# Patient Record
Sex: MALE | Race: WHITE | NOT HISPANIC OR LATINO | URBAN - METROPOLITAN AREA
[De-identification: names, ages, dates, MRNs, and addresses within clinical notes are randomized per-mention and may not be internally consistent; named-entity substitution may affect disease eponyms.]

---

## 2019-11-16 ENCOUNTER — SURGERY - HEALTHEAST (OUTPATIENT)
Dept: GASTROENTEROLOGY | Facility: HOSPITAL | Age: 34
End: 2019-11-16

## 2019-11-16 ENCOUNTER — SURGERY - HEALTHEAST (OUTPATIENT)
Dept: SURGERY | Facility: HOSPITAL | Age: 34
End: 2019-11-16

## 2019-11-16 ENCOUNTER — ANESTHESIA - HEALTHEAST (OUTPATIENT)
Dept: SURGERY | Facility: HOSPITAL | Age: 34
End: 2019-11-16

## 2019-11-16 ENCOUNTER — HOSPITAL ENCOUNTER (OUTPATIENT)
Dept: SURGERY | Facility: HOSPITAL | Age: 34
Discharge: HOME | End: 2019-11-16
Attending: INTERNAL MEDICINE | Admitting: INTERNAL MEDICINE

## 2019-11-16 DIAGNOSIS — T18.108A FOREIGN BODY IN ESOPHAGUS, INITIAL ENCOUNTER: ICD-10-CM

## 2019-11-16 ASSESSMENT — MIFFLIN-ST. JEOR
SCORE: 1832.57

## 2021-06-03 NOTE — ANESTHESIA CARE TRANSFER NOTE
Last vitals:   Vitals:    11/16/19 1943   BP: 150/74   Pulse: 88   Resp: 16   Temp: 37.6  C (99.6  F)   SpO2: 100%     Patient's level of consciousness is drowsy  Spontaneous respirations: yes  Maintains airway independently: yes  Dentition unchanged: yes  Oropharynx: oropharynx clear of all foreign objects    QCDR Measures:  ASA# 20 - Surgical Safety Checklist: WHO surgical safety checklist completed prior to induction    PQRS# 430 - Adult PONV Prevention: 4558F - Pt received => 2 anti-emetic agents (different classes) preop & intraop  ASA# 8 - Peds PONV Prevention: NA - Not pediatric patient, not GA or 2 or more risk factors NOT present  PQRS# 424 - Tabatha-op Temp Management: 4559F - At least one body temp DOCUMENTED => 35.5C or 95.9F within required timeframe  PQRS# 426 - PACU Transfer Protocol: - Transfer of care checklist used  ASA# 14 - Acute Post-op Pain: ASA14B - Patient did NOT experience pain >= 7 out of 10     Volatile agents turned off, muscle relaxant reversed, 4/4 twitches with sustained tetany. Pt breathing spontaneously with adequate tidal volumes, following commands, gently suctioned oropharynx, extubated without issue. 10LPM O2 applied via face mask.Transported by CRNA and RN to recovery.

## 2021-06-03 NOTE — ANESTHESIA PREPROCEDURE EVALUATION
Anesthesia Evaluation      No history of anesthetic complications     Airway   Mallampati: I  Neck ROM: full   Pulmonary - negative ROS    breath sounds clear to auscultation                         Cardiovascular   Exercise tolerance: > or = 4 METS  Rhythm: regular  Rate: normal,         Neuro/Psych - negative ROS     Endo/Other    (+) obesity,      GI/Hepatic/Renal      Comments: Piece of pork stuck in esophagus, not cleared with EZ gas or glucagon in ED, s/f EGD for removal.      Other findings: Pt ate (and caused FB) @ 1400 (5 hrs ago) Actively retching, liquid/bile/secretions frequently spit into emesis basin      Dental - normal exam                        Anesthesia Plan  Planned anesthetic: general endotracheal  GETA-RSI with cricoid pressure and succinylcholine  Decadron 10, Zofran 4 for antiemesis  ASA 2 - emergent   Induction: intravenous   Anesthetic plan and risks discussed with: patient and spouse  Anesthesia plan special considerations: rapid sequence induction, antiemetics,   Post-op plan: routine recovery

## 2021-06-03 NOTE — ED PROVIDER NOTES
EMERGENCY DEPARTMENT ENCOUNTER      NAME: Nicola Freedman  AGE: 34 y.o. male  YOB: 1985  MRN: [id number]  EVALUATION DATE & TIME: 11/16/2019  2:57 PM    ED PROVIDER: Brennen Alfonso M.D.    FINAL IMPRESSION:  1. Foreign body in esophagus, initial encounter      ED COURSE & MEDICAL DECISION MAKING:    3:07 PM  Patient seen and examined.  Prior history and records reviewed.  Patient presents with inability to swallow secretions after getting pork stuck in his throat.  Breathing and speaking without difficulty.  Glucagon and easy gas are ordered.  If this does not relieve symptoms, gastroenterology will be consulted.  Long-term, patient should start a PPI.  3:58 PM No improvement after glucagon and EZ Gas.  GI will be consulted.   4:28 PM care discussed with KOLBY Ward who will see the patient in the department.  5:00 PM  Signout to oncoming provider.    At the conclusion of the encounter I discussed the results of all of the tests and the disposition. The questions were answered. The patient or family acknowledged understanding and was agreeable with the care plan.     PROCEDURES:   Procedures      MEDICATIONS GIVEN IN THE EMERGENCY:  Medications   glucagon (human recombinant) injection 1 mg (1 mg Intravenous Given 11/16/19 1544)   sod bicarb-citric acid-simethicone packet 1 packet (E-Z GAS) (1 packet Oral Given 11/16/19 4923)       NEW PRESCRIPTIONS STARTED AT TODAY'S ER VISIT  There are no discharge medications for this patient.       PCP: No primary care provider on file.  =================================================================    Chief Complaint   Patient presents with     esophageal obstruction       HPI      Nicola Freedman is a 34 y.o. male who presents with esophageal foreign body.  Patient was eating pork, feels like he has a piece stuck.  He has had this before but usually gets better on its own.  At this occurred about 1 hour ago.  He is unable to swallow and is spitting  secretions.    REVIEW OF SYSTEMS   Review of Systems   See HPI.  All other systems reviewed and negative.    PAST MEDICAL HISTORY:  Relevant past medical history reviewed with patient, unless otherwise stated in HPI, history not pertinent to this visit.    PAST SURGICAL HISTORY:  Relevant past surgical history reviewed with patient, unless otherwise stated in HPI, history not pertinent to this visit.    CURRENT MEDICATIONS:    No current facility-administered medications on file prior to encounter.      No current outpatient medications on file prior to encounter.       ALLERGIES:  No Known Allergies    FAMILY HISTORY:  No family history on file.    SOCIAL HISTORY:   Social History     Socioeconomic History     Marital status: Not on file     Spouse name: Not on file     Number of children: Not on file     Years of education: Not on file     Highest education level: Not on file   Occupational History     Not on file   Social Needs     Financial resource strain: Not on file     Food insecurity:     Worry: Not on file     Inability: Not on file     Transportation needs:     Medical: Not on file     Non-medical: Not on file   Tobacco Use     Smoking status: Not on file   Substance and Sexual Activity     Alcohol use: Not on file     Drug use: Not on file     Sexual activity: Not on file   Lifestyle     Physical activity:     Days per week: Not on file     Minutes per session: Not on file     Stress: Not on file   Relationships     Social connections:     Talks on phone: Not on file     Gets together: Not on file     Attends [employer] witness service: Not on file     Active member of club or organization: Not on file     Attends meetings of clubs or organizations: Not on file     Relationship status: Not on file     Intimate partner violence:     Fear of current or ex partner: Not on file     Emotionally abused: Not on file     Physically abused: Not on file     Forced sexual activity: Not on file   Other Topics Concern     Not  on file   Social History Narrative     Not on file       VITALS:  Patient Vitals for the past 24 hrs:   BP Temp Temp src Pulse Resp SpO2 Weight   11/16/19 1455 140/78 98.2  F (36.8  C) Oral 67 18 100 % 200 lb (90.7 kg)     PHYSICAL EXAM    Physical Exam   Constitutional: He is oriented to person, place, and time. He appears well-developed and well-nourished.   HENT:   Head: Normocephalic and atraumatic.   Nose: Nose normal.   Mouth/Throat: Oropharynx is clear and moist.   Eyes: Pupils are equal, round, and reactive to light. Conjunctivae and EOM are normal.   Neck: Normal range of motion. Neck supple.   Cardiovascular: Normal rate, regular rhythm and normal heart sounds.   Pulmonary/Chest: Effort normal and breath sounds normal.   Abdominal: Soft. Bowel sounds are normal. There is no abdominal tenderness. There is no rebound and no guarding.   Spitting secretions   Musculoskeletal: Normal range of motion.   Lymphadenopathy:     He has no cervical adenopathy.   Neurological: He is alert and oriented to person, place, and time. Coordination normal.   No gross focal neurologic deficits.  Cranial nerves III-XII intact.   Skin: Skin is warm and dry.   Psychiatric: He has a normal mood and affect. His behavior is normal.   Nursing note and vitals reviewed.    Brennen Alfonso M.D.  Emergency Medicine  [facility] EMERGENCY DEPARTMENT  [address] [facility] [address]  Dept: [phone]  Loc: [phone]     Brennen Alfonso MD  11/16/19 2683

## 2021-06-03 NOTE — ED TRIAGE NOTES
Pt reports eating pork that became stuck in his esophagus x1 hour ago. Reports that this has happened several times in the past and is usually able to clear the obstruction but has not been successful today.

## 2021-06-03 NOTE — ANESTHESIA POSTPROCEDURE EVALUATION
Patient: Nicola Freedman  ESOPHAGOGASTRODUODENOSCOPY WITH FOREIGN BODY REMOVAL  Anesthesia type: general    Patient location: PACU  Last vitals:   Vitals Value Taken Time   /71 11/16/2019  8:00 PM   Temp 37.6  C (99.6  F) 11/16/2019  7:43 PM   Pulse 84 11/16/2019  8:00 PM   Resp 16 11/16/2019  8:00 PM   SpO2 94 % 11/16/2019  8:00 PM     Post vital signs: stable  Level of consciousness: awake and responds to simple questions  Post-anesthesia pain: pain controlled  Post-anesthesia nausea and vomiting: no  Pulmonary: unassisted, return to baseline, room air  Cardiovascular: stable and blood pressure at baseline  Hydration: adequate  Anesthetic events: no    QCDR Measures:  ASA# 11 - Tabatha-op Cardiac Arrest: ASA11B - Patient did NOT experience unanticipated cardiac arrest  ASA# 12 - Tabatha-op Mortality Rate: ASA12B - Patient did NOT die  ASA# 13 - PACU Re-Intubation Rate: ASA13B - Patient did NOT require a new airway mgmt  ASA# 10 - Composite Anes Safety: ASA10A - No serious adverse event    Additional Notes:

## 2021-06-03 NOTE — PROCEDURES
ESOPHAGOGASTRODUODENOSCOPY NOTE        Patient Name:            Nicola Freedman      Date of Procedure:   November 16, 2019      Endoscopist:             Bautista West      Procedure:                                ESOPHAGOGASTRODUODENOSCOPY      Pre-operative Diagnosis:         Esophageal foreign body      Post-operative Diagnosis:       same      Indications:                               food impaction      Medications:                             Versed 6.0 mg, Fentanyl 200 mcg, Benadryl 50 mg    The procedure was performed with administration of intravenous conscious sedation with appropriate preoperative, intraoperative, and postoperative evaluations.  0 Hr 23 Min 35 Sec of supervised face to face conscious sedation time was provided by a Registered Nurse under my direct supervision.    Procedure Details    The patient was informed of the risks, benefits and alternatives and gave informed consent. The patient had stable cardiovascular status and was judged to be adequate for sedation. A timeout was performed.    The patient was placed in the left lateral decubitus position. The endoscope was introduced through the mouth and advanced to upper esophagus.     Endoscopy Findings:                Esophagus: Food bolus at 25 cm, small pieces of meat were retrieved using a net, snare, and raptor forceps. Procedure was terminated due to patient intolerance and inadequate sedation.  Stomach: n/a  Duodenum: n/a    Estimated Blood Loss:           none      Specimens:                              none      Complications:                       None; patient tolerated the procedure well.      Impression:                             Esophageal foreign body               Recommendations:                Repeat procedure with anesthesia support in OR      Bautista West MD  11/16/2019 6:18 PM  [facility]  [phone]

## 2021-06-03 NOTE — PROGRESS NOTES
PRE PROCEDURE NOTE      Chief Complaint/Reason for Procedure:              Esophageal FB      History and Physical Reviewed:   Reviewed no changes               Pre-sedation assessment:            General appearance: alert, appears stated age and cooperative  Lungs: clear to auscultation bilaterally  Heart: regular rate and rhythm, S1, S2 normal, no murmur, click, rub or gallop  Abdomen: soft, non-tender; bowel sounds normal; no masses,  no organomegaly  Airway: normal      Previous reaction to anesthesia/sedation:  none      Sedation Plan based on assessment: Moderate      Comments: none      ASA Classification: ASA status not filed in the log.      Impression:  Patient deemed adequate candidate for conscious sedation         Plan:   esophagogastroduodenoscopy      Bautista West MD  11/16/2019 5:31 PM  [facility]  [phone]

## 2021-06-03 NOTE — ED PROVIDER NOTES
eMERGENCY dEPARTMENT PROGRESS NOTE      Patient was signed out to me by Dr. Alfonso at 4:55 PM.      ED COURSE & MEDICAL DECISION MAKING    Pertinent Labs and Imagaing reviewed (see chart for details)    34 y.o. male who presented for evaluation of esophageal foreign body. The patient was eating pork, feel like he has a piece stuck. He has had this before but usually relieves on its own. Occurred about 1 hour ago. He is unable to swallow and is spitting secretions.    5:44 PM GI arrived and began removal process.  6:10 PM GI was unsuccessful and anesthesia and surgery has been paged.  6:54 PM The patient is being taken to the OR for removal and will be discharged or admitted from there.    At the conclusion of the encounter I discussed  the results of all of the tests and the disposition.   The questions were answered.  The patient or family acknowledged understanding and was agreeable with the care plan.       FINAL IMPRESSION        Final diagnoses:   Foreign body in esophagus, initial encounter            Janelle Berry MD  11/16/19 1228

## 2021-06-03 NOTE — OP NOTE
ESOPHAGOGASTRODUODENOSCOPY NOTE        Patient Name:            Nicola Freedman      Date of Procedure:   November 16, 2019      Endoscopist:             Bautista West      Procedure:                                ESOPHAGOGASTRODUODENOSCOPY      Pre-operative Diagnosis:         Esophageal foreign body      Post-operative Diagnosis:       same      Indications:                               food impaction      Medications:                             General anesthesia        Procedure Details    The patient was informed of the risks, benefits and alternatives and gave informed consent. The patient had stable cardiovascular status and was judged to be adequate for sedation. A timeout was performed.    The patient was placed in the left lateral decubitus position. The endoscope was introduced through the mouth and advanced to the duodenal bulb. Careful inspection was made upon withdrawal. Retroflexion was performed.    Endoscopy Findings:                Esophagus: food bolus at 23 cm, removed successfully with a 4-pronged grasper; ringed-esophagus  Stomach: normal  Duodenum: normal    Estimated Blood Loss:           none      Specimens:                              none      Complications:                       None; patient tolerated the procedure well.      Impression:                                 1. Esophageal foreign body  2. Probable eosinophilic esophagitis        Recommendations:      1. Repeat EGD with biopsies in 1-2 weeks  2. Take Prilosec OTC daily until follow-up EGD                    Bautista West MD  11/16/2019 7:37 PM  [facility]  [phone]

## 2021-06-04 VITALS
WEIGHT: 200 LBS | BODY MASS INDEX: 29.62 KG/M2 | BODY MASS INDEX: 29.62 KG/M2 | WEIGHT: 200 LBS | WEIGHT: 200 LBS | BODY MASS INDEX: 29.62 KG/M2 | HEIGHT: 69 IN | HEIGHT: 69 IN | HEIGHT: 69 IN

## 2021-06-16 PROBLEM — T18.108A FOREIGN BODY IN ESOPHAGUS, INITIAL ENCOUNTER: Status: ACTIVE | Noted: 2019-11-16
